# Patient Record
Sex: FEMALE | Race: WHITE | NOT HISPANIC OR LATINO | URBAN - METROPOLITAN AREA
[De-identification: names, ages, dates, MRNs, and addresses within clinical notes are randomized per-mention and may not be internally consistent; named-entity substitution may affect disease eponyms.]

---
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
## 2022-02-11 VITALS
TEMPERATURE: 97.8 F | TEMPERATURE: 96.8 F | BODY MASS INDEX: 50.02 KG/M2 | DIASTOLIC BLOOD PRESSURE: 74 MMHG | HEIGHT: 64 IN | WEIGHT: 293 LBS | HEIGHT: 64 IN | SYSTOLIC BLOOD PRESSURE: 122 MMHG | HEART RATE: 93 BPM | BODY MASS INDEX: 50.02 KG/M2 | WEIGHT: 293 LBS | OXYGEN SATURATION: 97 % | HEART RATE: 84 BPM | DIASTOLIC BLOOD PRESSURE: 84 MMHG | SYSTOLIC BLOOD PRESSURE: 128 MMHG | OXYGEN SATURATION: 97 %

## 2022-02-15 NOTE — NURSING NOTE
CAGE Assessment Entered On:  3/31/2021 4:19 PM CDT    Performed On:  3/31/2021 4:19 PM CDT by Quita Bae LPN               Assessment   Have you ever felt you should cut down on your drinking :   No   Have people annoyed you by criticizing your drinking :   No   Have you ever felt bad or guilty about your drinking :   No   Have you ever taken a drink first thing in the morning to steady your nerves or get rid of a hangover (Eye-opener) :   No   CAGE Score :   0    Quita Bae LPN - 3/31/2021 4:19 PM CDT

## 2022-02-15 NOTE — NURSING NOTE
Depression Screening Entered On:  3/31/2021 4:20 PM CDT    Performed On:  3/31/2021 4:19 PM CDT by Quita Bae LPN               Depression Screening   Little Interest - Pleasure in Activities :   More than half the days   Feeling Down, Depressed, Hopeless :   More than half the days   Initial Depression Screen Score :   4 Score   Poor Appetite or Overeating :   More than half the days   Trouble Falling or Staying Asleep :   More than half the days   Feeling Tired or Little Energy :   Several days   Feeling Bad About Yourself :   More than half the days   Trouble Concentrating :   Several days   Moving or Speaking Slowly :   Several days   Thoughts Better Off Dead or Hurting Self :   Several days   Difficulty at Work, Home, Getting Along :   Somewhat difficult   Detailed Depression Screen Score :   10    Total Depression Screen Score :   14    Quita Bae LPN - 3/31/2021 4:19 PM CDT

## 2022-02-15 NOTE — PROGRESS NOTES
Patient:   MIN CONWAY            MRN: [id number]            FIN: [id number]               Age:   20 years     Sex:  Female     :  2000   Associated Diagnoses:   Morbid obesity; PCOS (polycystic ovarian syndrome)   Author:   Vilma Khan      Visit Information      Date of Service: 2021 01:28 pm  Performing Location: [facility]  Encounter#: [id number]      Primary Care Provider (PCP):  NONE ,       Referring Provider:  Vilma Khan    NPI# [id number]      Chief Complaint   2021 1:32 PM CDT    here to discuss labs (DALT), patient has the results        History of Present Illness   She has DALT labs with her, Lipids indicate overall chol 215, , HDL 46, trigs are a bit elevated.  A1c is 5.6 but fasting glucose is 108  TSH is just a touch about normal  strong FH of heart disease and DM  she gained #40 this year  she has binged in past but never purged  she has strived for 1400 nawaf per day to loose wt, now back to 2000 nawaf per day  would like to loose wt in a healthy way  no menses since Feb, has had neg UPT last week  not interested in BCP or hormones at this time due to moodiness  partner always using condoms      Health Status   Allergies:    Allergic Reactions (Selected)  No Known Medication Allergies   Medications:  (Selected)   Prescriptions  Prescribed  metFORMIN 500 mg oral tablet: = 1 tab(s) ( 500 mg ), Oral, bid, Instructions: start only q a.m. for 2 weeks then increase to 2x/day  with meals, # 45 EA, 2 Refill(s), Type: Maintenance, Pharmacy: [id number], 1 tab(s) Oral bid,x30 day(s),Instr:start only q a.m. for 2 wee...,    Medications          *denotes recorded medication          metFORMIN 500 mg oral tablet: 500 mg, 1 tab(s), Oral, bid, for 30 day(s), start only q a.m. for 2 weeks then increase to 2x/day  with meals, 45 EA, 2 Refill(s).       Problem list:    All Problems  Morbid obesity / SNOMED CT 261206783 / Probable      Histories    Past Medical History:    No active or resolved past medical history items have been selected or recorded.   Family History:    Diabetes mellitus  Father  Hypertension  Father  Hypothyroidism  Mother  High cholesterol  Father     Procedure history:    Tonsillectomy (SNOMED CT 643659205).   Social History:        Electronic Cigarette/Vaping Assessment            Electronic Cigarette Use: Never.      Alcohol Assessment            Never      Tobacco Assessment            Never (less than 100 in lifetime)      Substance Abuse Assessment            Never      Employment and Education Assessment            Student, Highest education level: Some college.      Home and Environment Assessment            Marital status: Single.  Living situation: Home/Independent.  Injuries/Abuse/Neglect in household: No.  Feels               unsafe at home: No.  Family/Friends available for support: Yes.      Nutrition and Health Assessment            Type of diet: Regular.  Wants to lose weight: Yes.  Sleeping concerns: Yes.  Feels highly stressed: Yes.      Exercise and Physical Activity Assessment            Exercise frequency: 1-2 times/week.  Exercise type: Cardio.      Sexual Assessment            Sexually active: Yes.  Identifies as female, Sexual orientation: Straight or heterosexual.  History of STD:               No.  Uses condoms: Yes.  History of sexual abuse: No.        Physical Examination   VS/Measurements   General:  Alert and oriented, No acute distress.       Impression and Plan   Diagnosis     Morbid obesity (UOQ25-IN E66.01).     PCOS (polycystic ovarian syndrome) (RSK42-JS E28.2).     Patient Instructions:       Counseled: Patient, Regarding diagnosis, Regarding treatment, Regarding medications, Verbalized understanding, video f/u this summer  use/risk/benefit of metformin, diet changes counseled  will retest TSH in 3 months.    Orders     Orders (Selected)   Prescriptions  Prescribed  metFORMIN 500 mg oral tablet: = 1  tab(s) ( 500 mg ), Oral, bid, Instructions: start only q a.m. for 2 weeks then increase to 2x/day  with meals, # 45 EA, 2 Refill(s), Type: Maintenance, Pharmacy: [id number], 1 tab(s) Oral bid,x30 day(s),Instr:start only q a.m. for 2 wee....

## 2022-02-15 NOTE — TELEPHONE ENCOUNTER
---------------------  From: Maru Garzon (Appointment Pool ([id number]))   To: MIN CONWAY    Sent: 2021 12:18:29 PM CDT  Subject: RE: Appointment Request     You are scheduled with Vilma Miguel on  at 1:40p. If this does not work, please let us know. Thank you!      ---------------------  From: MIN CONWAY  To: [facility]  Sent: 2021 12:04 p.m. CDT  Subject: Appointment Request  Patient Name: MIN CONWAY  Patient : 2000  Preferred Provider: Vilma Miguel  Appointment Dates: Between 2021 and 2021  Preferred Days: Tuesday,Thursday  Preferred Time: Afternoon  Contact Patient by: Secure Message    Reason for Visit:    This appointment would be a follow-up for lab test results.

## 2022-02-15 NOTE — NURSING NOTE
Comprehensive Intake Entered On:  3/31/2021 3:03 PM CDT    Performed On:  3/31/2021 3:00 PM CDT by Quita Bae LPN               Summary   Chief Complaint :   [employer] student, has been talking with school therapist and it was suggested that she schedule appt to discuss starting meds   Weight Measured :   297 lb(Converted to: 297 lb 0 oz, 134.717 kg)    Height Measured :   64.25 in(Converted to: 5 ft 4 in, 163.19 cm)    Body Mass Index :   50.58 kg/m2   Body Surface Area :   2.47 m2   Systolic Blood Pressure :   128 mmHg   Diastolic Blood Pressure :   84 mmHg (HI)    Mean Arterial Pressure :   99 mmHg   Peripheral Pulse Rate :   93 bpm   BP Site :   Right arm   BP Method :   Manual   Temperature Tympanic :   97.8 DegF(Converted to: 36.6 DegC)  (LOW)    Oxygen Saturation :   97 %   Quita Bae LPN - 3/31/2021 3:00 PM CDT   Health Status   Allergies Verified? :   Yes   Medication History Verified? :   Yes   Medical History Verified? :   No   Pre-Visit Planning Status :   Completed   Tobacco Use? :   Never smoker   Quita Bae LPN - 3/31/2021 3:00 PM CDT   Meds / Allergies   (As Of: 3/31/2021 3:03:57 PM CDT)   Allergies (Active)   No Known Medication Allergies  Estimated Onset Date:   Unspecified ; Created By:   Quita Bae LPN; Reaction Status:   Active ; Category:   Drug ; Substance:   No Known Medication Allergies ; Type:   Allergy ; Updated By:   Quita Bae LPN; Reviewed Date:   3/31/2021 3:01 PM CDT        Medication List   (As Of: 3/31/2021 3:03:57 PM CDT)        ID Risk Screen   Recent Travel History :   No recent travel   Family Member Travel History :   No recent travel   Other Exposure to Infectious Disease :   Unknown   COVID-19 Testing Status :   Positive COVID-19 test greater than 30 days ago   Quita Bae LPN - 3/31/2021 3:00 PM CDT   Social History   Social History   (As Of: 3/31/2021 3:03:57 PM CDT)   Tobacco:        Never (less than 100 in lifetime)   (Last  Updated: 3/31/2021 3:01:18 PM CDT by Quita Bae LPN)          Electronic Cigarette/Vaping:        Electronic Cigarette Use: Never.   (Last Updated: 3/31/2021 3:01:22 PM CDT by Quita Bae LPN)

## 2022-02-15 NOTE — PROGRESS NOTES
Patient:   MIN CONWAY            MRN: [id number]            FIN: [id number]               Age:   20 years     Sex:  Female     :  2000   Associated Diagnoses:   Depressive state; Morbid obesity   Author:   Vilma Khan      Visit Information      Date of Service: 2021 02:47 pm  Performing Location: [facility]  Encounter#: [id number]      Chief Complaint   3/31/2021 3:00 PM CDT    [employer] student, has been talking with school therapist and it was suggested that she schedule appt to discuss starting meds      History of Present Illness     PHQ and CAGE scoring reviewed with pt    The patient is new to the clinic.  She was referred by her counselor at the [facility].  She has been following with the counselor since November for symptoms of depression.  The patient grew up near [place], [place].  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  She is living with her roommate at the [employer] and that is going very well.  She feels like she has been struggling with depression since her senior year in high school or maybe prior to that.  She has always kept busy and didn t have an evaluation done prior to arriving at the [employer].  This is also the first that she has done any counseling.  She has some trouble falling asleep and feels badly about herself.  She has little interest in doing things.  She was thinking of self-harm last fall but no longer.  She really enjoys her friendships and turns to her friends when she needs support.  She feels like she is very social and enjoys meeting new people as well.  She was on birth control pills last fall and thought those were interfering with her mood, so she stopped those.  She is with a male partner right now and uses condoms 100%.  Her last menstrual period was  and they are fairly predictable each month.  Family history is significant for dad with diabetes, hyperlipidemia,  hypertension and obesity.  Mother with hypothyroidism.  The patient has never had any lab work done for any of these conditions.  She has gained 40 pounds since last fall and really feels that she has not changed her eating habits at all.          Review of Systems   All other systems.     Health Status   Allergies:    Allergic Reactions (Selected)  No Known Medication Allergies   Medications:  (Selected)      Problem list:    All Problems  Morbid obesity / SNOMED CT 090278838 / Probable      Histories   Past Medical History:    No active or resolved past medical history items have been selected or recorded.   Family History:    No family history items have been selected or recorded.   Procedure history:    No active procedure history items have been selected or recorded.   Social History:        Electronic Cigarette/Vaping Assessment            Electronic Cigarette Use: Never.      Tobacco Assessment            Never (less than 100 in lifetime)        Physical Examination   Vital Signs   3/31/2021 3:00 PM CDT Temperature Tympanic 97.8 DegF  LOW    Peripheral Pulse Rate 93 bpm    Systolic Blood Pressure 128 mmHg    Diastolic Blood Pressure 84 mmHg  HI    Mean Arterial Pressure 99 mmHg    BP Site Right arm    BP Method Manual    Oxygen Saturation 97 %      Measurements from flowsheet : Measurements   3/31/2021 3:00 PM CDT Height Measured - Standard 64.25 in    Height/Length Z-score -14.89    Weight Measured - Standard 297 lb    Weight Percentile 99.95    Weight Z-score 3.29    BSA 2.47 m2    Body Mass Index 50.58 kg/m2    Body Mass Index Percentile 99.39    BMI Z-score 2.51      General:  Alert and oriented, No acute distress, good eye contact, engaging with conversation.    Neck:  Supple, Non-tender, No carotid bruit, No lymphadenopathy, No thyromegaly.    Respiratory:  Lungs are clear to auscultation, Respirations are non-labored.    Integumentary:  Warm, Dry, Pink.    Psychiatric:  Cooperative, Appropriate mood &  affect, Normal judgment, Non-suicidal.       Impression and Plan   Diagnosis     Depressive state (KOK17-HN F32.9).     Morbid obesity (VGQ28-NV E66.01).     Patient Instructions:  Lifestyle risk factors.         Limitations: Alcohol consumption.         Counseled: Patient, Regarding diagnosis, Regarding treatment, Regarding medications, Diet, Activity, Verbalized understanding, will hold off on antidepressents till labs checked, she will talk about this with parents, infor on DALT labs given  If labs normal, certainly advised start of SSRI for depression  Plan B given today to have in advance.    Orders     Orders (Selected)   .

## 2022-02-15 NOTE — NURSING NOTE
Generalized Anxiety Disorder Screening Entered On:  3/31/2021 4:20 PM CDT    Performed On:  3/31/2021 4:19 PM CDT by Quita Bae LPN               Generalized Anxiety Disorder Screening   ALICIA Nervous, Anxious On Edge :   Several days   ALICIA Control Worrying B :   Several days   ALICIA Worrying Too Much :   More than half the days   ALICIA Trouble Relaxing :   More than half the days   ALICIA Restless :   Not at all   ALICIA Easily Annoyed/Irritable :   More than half the days   ALICIA Afraid :   Several days   ALICIA Total Screening Score :   9    ALICIA Difficulty with Work, Home, Others :   Not difficult at all   Quita Bae LPN - 3/31/2021 4:19 PM CDT

## 2022-02-15 NOTE — NURSING NOTE
Comprehensive Intake Entered On:  4/27/2021 1:35 PM CDT    Performed On:  4/27/2021 1:32 PM CDT by Quita Bae LPN               Summary   Chief Complaint :   here to discuss labs (DALT), patient has the results   Weight Measured :   299.5 lb(Converted to: 299 lb 8 oz, 135.851 kg)    Height Measured :   64.25 in(Converted to: 5 ft 4 in, 163.19 cm)    Body Mass Index :   51 kg/m2   Body Surface Area :   2.48 m2   Systolic Blood Pressure :   122 mmHg   Diastolic Blood Pressure :   74 mmHg   Mean Arterial Pressure :   90 mmHg   Peripheral Pulse Rate :   84 bpm   BP Site :   Right arm   BP Method :   Manual   Temperature Tympanic :   96.8 DegF(Converted to: 36.0 DegC)  (LOW)    Oxygen Saturation :   97 %   Quita Bae LPN - 4/27/2021 1:32 PM CDT   Health Status   Allergies Verified? :   Yes   Medication History Verified? :   Yes   Medical History Verified? :   No   Pre-Visit Planning Status :   Completed   Tobacco Use? :   Never smoker   Quita Bae LPN - 4/27/2021 1:32 PM CDT   Meds / Allergies   (As Of: 4/27/2021 1:35:51 PM CDT)   Allergies (Active)   No Known Medication Allergies  Estimated Onset Date:   Unspecified ; Created By:   Quita Bae LPN; Reaction Status:   Active ; Category:   Drug ; Substance:   No Known Medication Allergies ; Type:   Allergy ; Updated By:   Quita Bae LPN; Reviewed Date:   3/31/2021 3:01 PM CDT        Medication List   (As Of: 4/27/2021 1:35:51 PM CDT)        ID Risk Screen   Recent Travel History :   No recent travel   Family Member Travel History :   No recent travel   Other Exposure to Infectious Disease :   Unknown   COVID-19 Testing Status :   Positive COVID-19 test greater than 30 days ago   Quita Bae LPN - 4/27/2021 1:32 PM CDT

## 2022-04-03 ENCOUNTER — HEALTH MAINTENANCE LETTER (OUTPATIENT)
Age: 22
End: 2022-04-03

## 2022-10-03 ENCOUNTER — HEALTH MAINTENANCE LETTER (OUTPATIENT)
Age: 22
End: 2022-10-03

## 2023-05-20 ENCOUNTER — HEALTH MAINTENANCE LETTER (OUTPATIENT)
Age: 23
End: 2023-05-20